# Patient Record
(demographics unavailable — no encounter records)

---

## 2025-07-07 NOTE — HISTORY OF PRESENT ILLNESS
[FreeTextEntry1] : ARCADIO AMOR 30 YO, presents for Annual & delayed menses G 3 P 1011 - Was induced @ 37 weeks for FGR- C/S for CAT-2 1 Top @ 7 weeks LMP: 05/25/2025 Regular menses UCG Positive @ home on 07/03/25 No Medication No family history of breast cancer. To do monthly SBE.  Complains of cramping x 3 days.  No health issues, nonsmoker, NKDA.  For dating sonogram today.

## 2025-07-07 NOTE — PROCEDURE
[Cervical Pap Smear] : cervical Pap smear [Liquid Base] : liquid base [GC & Chlamydia via Pap] : GC & Chlamydia via Pap [Tolerated Well] : the patient tolerated the procedure well [No Complications] : there were no complications [Transvaginal OB Sonogram] : Transvaginal OB Sonogram [Intrauterine Pregnancy] : intrauterine pregnancy [Yolk Sac] : yolk sac present [Fetal Heart] : fetal heart present [Date: ___] : Date: [unfilled] [Current GA by Sonogram: ___ (wks)] : Current GA by Sonogram: [unfilled]Uwks [___ day(s)] : [unfilled] days [Transvaginal OB Sonogram WNL] : Transvaginal OB Sonogram WNL [FreeTextEntry1] : Viable SIUP @ 6 weeks.